# Patient Record
Sex: FEMALE | Race: WHITE | NOT HISPANIC OR LATINO | Employment: STUDENT | URBAN - METROPOLITAN AREA
[De-identification: names, ages, dates, MRNs, and addresses within clinical notes are randomized per-mention and may not be internally consistent; named-entity substitution may affect disease eponyms.]

---

## 2024-02-07 ENCOUNTER — APPOINTMENT (OUTPATIENT)
Dept: RADIOLOGY | Facility: HOSPITAL | Age: 19
End: 2024-02-07
Payer: COMMERCIAL

## 2024-02-07 ENCOUNTER — HOSPITAL ENCOUNTER (EMERGENCY)
Facility: HOSPITAL | Age: 19
Discharge: HOME | End: 2024-02-07
Payer: COMMERCIAL

## 2024-02-07 VITALS
OXYGEN SATURATION: 97 % | HEIGHT: 69 IN | HEART RATE: 76 BPM | TEMPERATURE: 98.7 F | SYSTOLIC BLOOD PRESSURE: 134 MMHG | DIASTOLIC BLOOD PRESSURE: 72 MMHG | RESPIRATION RATE: 18 BRPM | BODY MASS INDEX: 24.44 KG/M2 | WEIGHT: 165 LBS

## 2024-02-07 DIAGNOSIS — S05.12XA ORBITAL CONTUSION, LEFT, INITIAL ENCOUNTER: ICD-10-CM

## 2024-02-07 DIAGNOSIS — S01.112A LEFT EYELID LACERATION, INITIAL ENCOUNTER: Primary | ICD-10-CM

## 2024-02-07 PROCEDURE — 12011 RPR F/E/E/N/L/M 2.5 CM/<: CPT

## 2024-02-07 PROCEDURE — 2500000001 HC RX 250 WO HCPCS SELF ADMINISTERED DRUGS (ALT 637 FOR MEDICARE OP)

## 2024-02-07 PROCEDURE — 67930 REPAIR EYELID WOUND: CPT

## 2024-02-07 PROCEDURE — 99283 EMERGENCY DEPT VISIT LOW MDM: CPT | Mod: 25,27

## 2024-02-07 PROCEDURE — 70150 X-RAY EXAM OF FACIAL BONES: CPT | Performed by: SURGERY

## 2024-02-07 PROCEDURE — 70150 X-RAY EXAM OF FACIAL BONES: CPT

## 2024-02-07 PROCEDURE — 2500000005 HC RX 250 GENERAL PHARMACY W/O HCPCS

## 2024-02-07 RX ORDER — BACITRACIN ZINC 500 UNIT/G
OINTMENT IN PACKET (EA) TOPICAL
Status: DISCONTINUED
Start: 2024-02-07 | End: 2024-02-07 | Stop reason: WASHOUT

## 2024-02-07 RX ORDER — LIDOCAINE HYDROCHLORIDE 10 MG/ML
5 INJECTION INFILTRATION; PERINEURAL ONCE
Status: COMPLETED | OUTPATIENT
Start: 2024-02-07 | End: 2024-02-07

## 2024-02-07 RX ORDER — BACITRACIN 500 [USP'U]/G
0.5 OINTMENT OPHTHALMIC 3 TIMES DAILY
Status: DISCONTINUED | OUTPATIENT
Start: 2024-02-07 | End: 2024-02-08 | Stop reason: HOSPADM

## 2024-02-07 RX ADMIN — BACITRACIN 0.5 INCH: 500 OINTMENT OPHTHALMIC at 22:05

## 2024-02-07 RX ADMIN — LIDOCAINE HYDROCHLORIDE 50 MG: 10 INJECTION, SOLUTION INFILTRATION; PERINEURAL at 21:32

## 2024-02-07 ASSESSMENT — COLUMBIA-SUICIDE SEVERITY RATING SCALE - C-SSRS
2. HAVE YOU ACTUALLY HAD ANY THOUGHTS OF KILLING YOURSELF?: NO
1. IN THE PAST MONTH, HAVE YOU WISHED YOU WERE DEAD OR WISHED YOU COULD GO TO SLEEP AND NOT WAKE UP?: NO
6. HAVE YOU EVER DONE ANYTHING, STARTED TO DO ANYTHING, OR PREPARED TO DO ANYTHING TO END YOUR LIFE?: NO

## 2024-02-07 ASSESSMENT — PAIN DESCRIPTION - PAIN TYPE: TYPE: ACUTE PAIN

## 2024-02-07 ASSESSMENT — PAIN DESCRIPTION - DESCRIPTORS: DESCRIPTORS: ACHING

## 2024-02-07 ASSESSMENT — PAIN - FUNCTIONAL ASSESSMENT: PAIN_FUNCTIONAL_ASSESSMENT: 0-10

## 2024-02-07 ASSESSMENT — PAIN DESCRIPTION - LOCATION: LOCATION: HEAD

## 2024-02-07 ASSESSMENT — PAIN SCALES - GENERAL: PAINLEVEL_OUTOF10: 2

## 2024-02-08 NOTE — ED PROVIDER NOTES
Chief Complaint   Patient presents with    laceration lt eye lid      Pt states hit another players head playing basketball denies LOC        18-year-old female arrives to the emergency department with a chief complaint of a left eye injury.  The patient was playing collegiate basketball, went down on the ground diving for a ball and collided heads with another player.  The patient states that she was struck above her left eye by the top of another player's head.  The patient denies any dizziness or loss of consciousness, however the patient does have a 1.5 cm linear horizontal laceration above the left eye.  The bleeding is controlled with mild pressure upon arrival.  The patient does have swelling and bruising periorbital on the superior aspect.  Patient denies any vision changes, patient has full range of extraocular motion without difficulty.  Patient endorses a 2 out of 10 minimal pain in the area worse with palpation.      History provided by:  Patient   used: No         PmHx, PsHx, Allergies, Family Hx, social Hx reviewed as documented    Given the focused nature of the complaint, only related components review of systems were evaluated, and abnormalities indicated in HPI    Physical Exam:    General: Patient is AAOx3, appears well developed, well nourished, is a good historian, answers questions appropriately    HEENT: head normocephalic, atraumatic, PERRLA, EOMs intact, oropharynx without erythema or exudate, buccal mucosa intact without lesions, TMs unremarkable, nose is patent bilateral    Pulmonary: CTAB, no accessory muscle use, able to speak full clear sentences    Cardiac: HRRR, no murmurs, rubs or gallops    GI: soft, non-tender, non-distended    Musculoskeletal: full weight bearing, PERES, no joint effusions, clubbing or edema noted    Skin: 1.5 cm linear, well-approximated laceration above left eye with surrounding bruising and swelling.  Otherwise skin intact, no lesions or  sergio noted, turgor is good.    Medical Decision Making  This patient was seen in the emergency department with an attending physician available at all times throughout their ED course    Primary consideration for this patient would be bleeding control, there is no pulsatile or arterial bleeding appreciated, bleeding is controlled with mild pressure.  Other consideration for the patient would be an orbital fracture of the left.  An x-ray of the facial bones will be used to further evaluate    Please see procedure note for suture closure, after anesthetic was applied and irrigation, a bloodless field was assessed to find no foreign body or debris, no prophylactic antibiotics will be started at this time, the patient will follow-up with any signs of infection.    The patient denies the need for any analgesics at this time    The patient's x-rays are negative for any acute abnormality, the patient verbalized understanding of wound care as well as suture removal.    Patient is amenable to the plan of discharge as outlined above, all patient's questions pertaining to their ED course were answered in their entirety.  Strict return precautions were discussed with the patient and they verbalized understanding.  Further, it was made clear to the patient that from an emergent basis, all effort and testing was done to eliminate any imminent dangerous or potentially dangerous conditions of the patient however if their symptoms get much worse or feel life-threatening, they are to return to the emergency department or call 911 immediately.    Amount and/or Complexity of Data Reviewed  Radiology: ordered. Decision-making details documented in ED Course.     Laceration Repair    Performed by: VIVIEN Ross  Authorized by: VIVIEN Ross    Consent:     Consent obtained:  Verbal    Consent given by:  Patient    Risks, benefits, and alternatives were discussed: yes      Risks discussed:  Infection, pain,  retained foreign body, tendon damage, vascular damage, poor wound healing, poor cosmetic result, nerve damage and need for additional repair    Alternatives discussed:  No treatment  Universal protocol:     Procedure explained and questions answered to patient or proxy's satisfaction: yes      Relevant documents present and verified: yes      Test results available: yes      Imaging studies available: yes      Required blood products, implants, devices, and special equipment available: yes      Site/side marked: yes      Patient identity confirmed:  Verbally with patient, hospital-assigned identification number and arm band  Anesthesia:     Anesthesia method:  Local infiltration    Local anesthetic:  Lidocaine 1% w/o epi  Laceration details:     Location:  Face    Face location:  L upper eyelid    Extent:  Partial thickness    Length (cm):  1.5  Pre-procedure details:     Preparation:  Patient was prepped and draped in usual sterile fashion  Exploration:     Hemostasis achieved with:  Direct pressure    Wound exploration: wound explored through full range of motion and entire depth of wound visualized      Wound extent: no areolar tissue violation noted, no fascia violation noted, no foreign bodies/material noted, no muscle damage noted, no nerve damage noted, no tendon damage noted, no underlying fracture noted and no vascular damage noted      Contaminated: no    Treatment:     Area cleansed with:  Saline and Shur-Clens    Amount of cleaning:  Standard    Irrigation solution:  Sterile saline    Irrigation method:  Syringe    Visualized foreign bodies/material removed: no      Debridement:  None    Undermining:  None    Scar revision: no    Skin repair:     Repair method:  Sutures    Suture size:  6-0    Suture material:  Nylon    Suture technique:  Simple interrupted    Number of sutures:  4  Approximation:     Approximation:  Close  Repair type:     Repair type:  Simple  Post-procedure details:     Dressing:   "Antibiotic ointment    Procedure completion:  Tolerated well, no immediate complications  Comments:      Ophthalmic bacitracin placed     Diagnoses as of 02/07/24 2210   Left eyelid laceration, initial encounter   Orbital contusion, left, initial encounter       The patient has had the following imaging during this ER visit: XR FACIAL BONES 3+ VIEWS     Patient History   No past medical history on file.  No past surgical history on file.  No family history on file.  Social History     Tobacco Use    Smoking status: Not on file    Smokeless tobacco: Not on file   Substance Use Topics    Alcohol use: Not on file    Drug use: Not on file       ED Triage Vitals [02/07/24 2005]   Temperature Heart Rate Respirations BP   37.1 °C (98.7 °F) 76 18 134/72      Pulse Ox Temp Source Heart Rate Source Patient Position   97 % Tympanic Monitor Sitting      BP Location FiO2 (%)     Left arm --       Vitals:    02/07/24 2005   BP: 134/72   BP Location: Left arm   Patient Position: Sitting   Pulse: 76   Resp: 18   Temp: 37.1 °C (98.7 °F)   TempSrc: Tympanic   SpO2: 97%   Weight: 74.8 kg (165 lb)   Height: 1.753 m (5' 9\")               Tres Ayon, JONAH-CNP  02/07/24 2210    "

## 2024-02-08 NOTE — DISCHARGE INSTRUCTIONS
Please follow-up with either primary care physician, urgent care, or return to the emergency department in 5-8 days to have your sutures removed.  If you  notice any sign of redness, warmth, or any other major signs of infection, please return to the emergency department as you may be experiencing a complication in the way of infection and need antibiotics.